# Patient Record
Sex: FEMALE | Race: WHITE | NOT HISPANIC OR LATINO | URBAN - METROPOLITAN AREA
[De-identification: names, ages, dates, MRNs, and addresses within clinical notes are randomized per-mention and may not be internally consistent; named-entity substitution may affect disease eponyms.]

---

## 2022-07-27 ENCOUNTER — OFFICE VISIT (OUTPATIENT)
Dept: URBAN - METROPOLITAN AREA SURGERY CENTER 25 | Facility: SURGERY CENTER | Age: 47
End: 2022-07-27

## 2022-10-10 ENCOUNTER — DASHBOARD ENCOUNTERS (OUTPATIENT)
Age: 47
End: 2022-10-10

## 2022-10-10 ENCOUNTER — OFFICE VISIT (OUTPATIENT)
Dept: URBAN - METROPOLITAN AREA CLINIC 113 | Facility: CLINIC | Age: 47
End: 2022-10-10
Payer: COMMERCIAL

## 2022-10-10 VITALS
TEMPERATURE: 98 F | RESPIRATION RATE: 20 BRPM | SYSTOLIC BLOOD PRESSURE: 114 MMHG | WEIGHT: 251.8 LBS | BODY MASS INDEX: 47.54 KG/M2 | HEART RATE: 92 BPM | HEIGHT: 61 IN | DIASTOLIC BLOOD PRESSURE: 76 MMHG

## 2022-10-10 DIAGNOSIS — Z12.11 COLON CANCER SCREENING: ICD-10-CM

## 2022-10-10 DIAGNOSIS — K21.9 GASTROESOPHAGEAL REFLUX DISEASE, UNSPECIFIED WHETHER ESOPHAGITIS PRESENT: ICD-10-CM

## 2022-10-10 PROBLEM — 235595009: Status: ACTIVE | Noted: 2022-10-10

## 2022-10-10 PROCEDURE — 99203 OFFICE O/P NEW LOW 30 MIN: CPT | Performed by: STUDENT IN AN ORGANIZED HEALTH CARE EDUCATION/TRAINING PROGRAM

## 2022-10-10 RX ORDER — PRAVASTATIN SODIUM 20 MG/1
1 TABLET TABLET ORAL ONCE A DAY
Status: ACTIVE | COMMUNITY

## 2022-10-10 RX ORDER — HYDROCHLOROTHIAZIDE 50 MG/1
1 TABLET IN THE MORNING TABLET ORAL ONCE A DAY
Status: ACTIVE | COMMUNITY

## 2022-10-10 RX ORDER — RABEPRAZOLE SODIUM 20 MG/1
1 TABLET TABLET, DELAYED RELEASE ORAL TWICE A DAY
Qty: 60 TABLET | Refills: 3 | OUTPATIENT
Start: 2022-10-10

## 2022-10-10 RX ORDER — DULOXETINE 60 MG/1
1 CAPSULE CAPSULE, DELAYED RELEASE PELLETS ORAL ONCE A DAY
Status: ACTIVE | COMMUNITY

## 2022-10-10 RX ORDER — TIZANIDINE 2 MG/1
1 TABLET AS NEEDED TABLET ORAL AT BEDTIME
Status: ACTIVE | COMMUNITY

## 2022-10-10 RX ORDER — GABAPENTIN 100 MG/1
1 CAPSULE CAPSULE ORAL ONCE A DAY
Status: ACTIVE | COMMUNITY

## 2022-10-10 RX ORDER — RIMEGEPANT SULFATE 75 MG/75MG
1 TABLET ON THE TONGUE AND ALLOW TO DISSOLVE TABLET, ORALLY DISINTEGRATING ORAL
Status: ACTIVE | COMMUNITY

## 2022-10-10 RX ORDER — GALCANEZUMAB 120 MG/ML
AS DIRECTED INJECTION, SOLUTION SUBCUTANEOUS
Status: ACTIVE | COMMUNITY

## 2022-10-10 RX ORDER — ONABOTULINUMTOXINA 50 [USP'U]/1
AS DIRECTED INJECTION, POWDER, LYOPHILIZED, FOR SOLUTION INTRAMUSCULAR
Status: ACTIVE | COMMUNITY

## 2022-10-10 RX ORDER — LISINOPRIL 10 MG/1
1 1/2 TABLETS TABLET ORAL ONCE A DAY
Status: ACTIVE | COMMUNITY

## 2022-10-10 RX ORDER — SUMATRIPTAN SUCCINATE 3 MG/1
AS DIRECTED INJECTION, SOLUTION SUBCUTANEOUS AS NEEDED
Status: ACTIVE | COMMUNITY

## 2022-10-10 RX ORDER — ERGOCALCIFEROL 1.25 MG/1
1 CAPSULE CAPSULE ORAL
Status: ACTIVE | COMMUNITY

## 2022-10-10 NOTE — HPI-TODAY'S VISIT:
Ms. Rodriguez is a 47-year-old female with a history of GERD, hyperlipidemia, morbid obesity, migraine who was referred by Dr. Nancy Bucio for colon cancer screening.  She also has symptoms related to reflux for which she is currently taking lansoprazole daily. . Her main complaint today is acid reflux. She states she has had symptoms for > 1 year, uncontrolled with daily lansoprazole. She takes her lansoprazole in the afternoon. She had an endoscopy several years ago however the report is not available for review, she does not believe any biopsies were taken. Her symptoms are worse at night. She has been to the ER with chest pain. She states she has been evaluated by cardiology and it was felt that her pain was related to reflux. She describes a burning in her substernum related to certain foods, worse at night when laying down. . She is interested in colonoscopy for colon cancer screening. She states she had a colonoscopy many years ago, report not available for review. She has no family history of colon cancer. She denies fevers/chill, nausea/vomiting, dysphagia/odynophagia, weight loss, rectal bleeding, diarrhea/constipation.

## 2022-10-11 ENCOUNTER — OFFICE VISIT (OUTPATIENT)
Dept: URBAN - METROPOLITAN AREA SURGERY CENTER 25 | Facility: SURGERY CENTER | Age: 47
End: 2022-10-11

## 2022-11-11 ENCOUNTER — OFFICE VISIT (OUTPATIENT)
Dept: URBAN - METROPOLITAN AREA SURGERY CENTER 25 | Facility: SURGERY CENTER | Age: 47
End: 2022-11-11
Payer: COMMERCIAL

## 2022-11-11 DIAGNOSIS — Z12.11 COLON CANCER SCREENING: ICD-10-CM

## 2022-11-11 PROCEDURE — G8907 PT DOC NO EVENTS ON DISCHARG: HCPCS | Performed by: STUDENT IN AN ORGANIZED HEALTH CARE EDUCATION/TRAINING PROGRAM

## 2022-11-11 PROCEDURE — 45378 DIAGNOSTIC COLONOSCOPY: CPT | Performed by: STUDENT IN AN ORGANIZED HEALTH CARE EDUCATION/TRAINING PROGRAM

## 2022-11-11 RX ORDER — GALCANEZUMAB 120 MG/ML
AS DIRECTED INJECTION, SOLUTION SUBCUTANEOUS
Status: ACTIVE | COMMUNITY

## 2022-11-11 RX ORDER — LISINOPRIL 10 MG/1
1 1/2 TABLETS TABLET ORAL ONCE A DAY
Status: ACTIVE | COMMUNITY

## 2022-11-11 RX ORDER — ONABOTULINUMTOXINA 50 [USP'U]/1
AS DIRECTED INJECTION, POWDER, LYOPHILIZED, FOR SOLUTION INTRAMUSCULAR
Status: ACTIVE | COMMUNITY

## 2022-11-11 RX ORDER — DULOXETINE 60 MG/1
1 CAPSULE CAPSULE, DELAYED RELEASE PELLETS ORAL ONCE A DAY
Status: ACTIVE | COMMUNITY

## 2022-11-11 RX ORDER — RIMEGEPANT SULFATE 75 MG/75MG
1 TABLET ON THE TONGUE AND ALLOW TO DISSOLVE TABLET, ORALLY DISINTEGRATING ORAL
Status: ACTIVE | COMMUNITY

## 2022-11-11 RX ORDER — RABEPRAZOLE SODIUM 20 MG/1
1 TABLET TABLET, DELAYED RELEASE ORAL TWICE A DAY
Qty: 60 TABLET | Refills: 3 | Status: ACTIVE | COMMUNITY
Start: 2022-10-10

## 2022-11-11 RX ORDER — HYDROCHLOROTHIAZIDE 50 MG/1
1 TABLET IN THE MORNING TABLET ORAL ONCE A DAY
Status: ACTIVE | COMMUNITY

## 2022-11-11 RX ORDER — SUMATRIPTAN SUCCINATE 3 MG/1
AS DIRECTED INJECTION, SOLUTION SUBCUTANEOUS AS NEEDED
Status: ACTIVE | COMMUNITY

## 2022-11-11 RX ORDER — GABAPENTIN 100 MG/1
1 CAPSULE CAPSULE ORAL ONCE A DAY
Status: ACTIVE | COMMUNITY

## 2022-11-11 RX ORDER — ERGOCALCIFEROL 1.25 MG/1
1 CAPSULE CAPSULE ORAL
Status: ACTIVE | COMMUNITY

## 2022-11-11 RX ORDER — TIZANIDINE 2 MG/1
1 TABLET AS NEEDED TABLET ORAL AT BEDTIME
Status: ACTIVE | COMMUNITY

## 2022-11-11 RX ORDER — PRAVASTATIN SODIUM 20 MG/1
1 TABLET TABLET ORAL ONCE A DAY
Status: ACTIVE | COMMUNITY

## 2023-01-04 ENCOUNTER — OFFICE VISIT (OUTPATIENT)
Dept: URBAN - METROPOLITAN AREA CLINIC 113 | Facility: CLINIC | Age: 48
End: 2023-01-04